# Patient Record
Sex: FEMALE | Race: WHITE | NOT HISPANIC OR LATINO | URBAN - METROPOLITAN AREA
[De-identification: names, ages, dates, MRNs, and addresses within clinical notes are randomized per-mention and may not be internally consistent; named-entity substitution may affect disease eponyms.]

---

## 2019-12-31 ENCOUNTER — EMERGENCY (EMERGENCY)
Facility: HOSPITAL | Age: 49
LOS: 0 days | Discharge: HOME | End: 2019-12-31
Attending: EMERGENCY MEDICINE | Admitting: EMERGENCY MEDICINE
Payer: COMMERCIAL

## 2019-12-31 VITALS
HEART RATE: 60 BPM | OXYGEN SATURATION: 99 % | DIASTOLIC BLOOD PRESSURE: 87 MMHG | SYSTOLIC BLOOD PRESSURE: 121 MMHG | TEMPERATURE: 97 F | RESPIRATION RATE: 18 BRPM

## 2019-12-31 DIAGNOSIS — R68.83 CHILLS (WITHOUT FEVER): ICD-10-CM

## 2019-12-31 DIAGNOSIS — K08.89 OTHER SPECIFIED DISORDERS OF TEETH AND SUPPORTING STRUCTURES: ICD-10-CM

## 2019-12-31 PROCEDURE — 99283 EMERGENCY DEPT VISIT LOW MDM: CPT | Mod: 25

## 2019-12-31 PROCEDURE — 64400 NJX AA&/STRD TRIGEMINAL NRV: CPT

## 2019-12-31 RX ORDER — BUPIVACAINE HCL/EPINEPHRINE/PF 0.5-1:200K
20 VIAL (ML) INJECTION ONCE
Refills: 0 | Status: DISCONTINUED | OUTPATIENT
Start: 2019-12-31 | End: 2019-12-31

## 2019-12-31 NOTE — ED PROVIDER NOTE - OBJECTIVE STATEMENT
48 yo female no sig hx present c/o right lower toothache/facial swelling x 1 day after right lower molar extraction 1 day ago. taking clinda and motrin without much relief. denies fever/chill/HA/ear pain/sore throat/chest pain/neck pain/sob/difficulty swallowing and breathing.

## 2019-12-31 NOTE — ED ADULT TRIAGE NOTE - CHIEF COMPLAINT QUOTE
pt c/o right tooth pain, has tooth extracted yesterday, taking motrin 800 and clindamycin, c/o pain, chills and fever.

## 2019-12-31 NOTE — ED PROVIDER NOTE - ATTENDING CONTRIBUTION TO CARE
49yF otherwise healthy p/w R lower tooth pain - had dental extraction yesterday - still on motrin/clindamycin but reports worsened pain, unrelieved by NSAIDs.  +chills.  No bleeding or purulent drainage.  No facial swelling.  Tolerating some PO.    exam w/ well appearing gums s/p dental extraction of posterior R lower molar  no concern for abscess at this time  nerve block  o/p f/u w/ dentist  continue PO abx, tylenol/motrin  return precautions

## 2019-12-31 NOTE — ED PROVIDER NOTE - PATIENT PORTAL LINK FT
You can access the FollowMyHealth Patient Portal offered by [facility] by registering at the following website: [email or web address]. By joining [employer]’s FollowMyHealth portal, you will also be able to view your health information using other applications (apps) compatible with our system.

## 2019-12-31 NOTE — ED PROCEDURE NOTE - CPROC ED POST PROC CARE GUIDE1
Avoid hot food./Verbal/written post procedure instructions were given to patient/caregiver./Instructed patient/caregiver regarding signs and symptoms of infection./Instructed patient/caregiver to follow-up with primary dentist./Avoid solid food.

## 2019-12-31 NOTE — ED PROVIDER NOTE - NSFOLLOWUPINSTRUCTIONS_ED_ALL_ED_FT
Dental Extraction, Care After  These instructions give you information about caring for yourself after your procedure. Your doctor may also give you more specific instructions. Call your doctor if you have any problems or questions after your procedure.  Follow these instructions at home:  Mouth care     Follow instructions from your doctor about how to take care of the area where your tooth was taken out. Make sure you:  Wash your hands with soap and water before you touch your mouth or your gauze pads. If you do not have soap and water, use hand .Change your gauze and take it out as told by your doctor.Leave stitches (sutures) in place. They may need to stay in place for 2 weeks or longer, or they may dissolve on their own over several weeks.If you have bleeding that does not stop, fold a clean piece of gauze and place it on the bleeding gum. Bite down on it gently but firmly. Do not chew on the gauze.Do not do any of these things until your doctor says it is okay:  Rinse your mouth.Brush or floss near the area where your tooth was taken out. You may brush your other teeth.Spit.After your doctor says that you may rinse your mouth:  Gargle with a salt-water mixture 3–4 times a day or as needed. To make a salt-water mixture, completely dissolve ½–1 tsp of salt in 1 cup of warm water.Rinse very gently. Do not rinse with a lot of force, because doing that can affect how your mouth heals.If you wear fake teeth (dental prostheses), talk with your doctor about when you may start to wear them again.Eating and drinking        Do not drink through a straw until your doctor says it is okay.Eat foods that are cool and have a soft texture, as told by your doctor. Some examples are ice cream and yogurt.Avoid hot drinks and spicy foods until your mouth has healed.Activity     Do not drive or use heavy machinery for 24 hours if you were given a medicine to help you relax (sedative) during your procedure.Do not drive or use heavy machinery while taking prescription pain medicine.Return to your normal activities as told by your doctor. Ask your doctor what activities are safe for you.Managing pain and swelling     If told, put ice on your cheek on the side of your mouth where the tooth was taken out:  Put ice in a plastic bag.Place a towel between your skin and the bag.Leave the ice on for 20 minutes, 2–3 times a day.General instructions     Take over-the-counter and prescription medicines only as told by your doctor.If you are taking prescription pain medicine, take actions to prevent or treat constipation. Your doctor may recommend that you:  Drink enough fluid to keep your pee (urine) pale yellow.Limit foods that are high in fat and processed sugars, such as fried or sweet foods.Take an over-the-counter or prescription medicine for constipation.If you were prescribed an antibiotic medicine, take it as told by your doctor. Do not stop taking the antibiotic even if you start to feel better.Do not use any products that contain nicotine or tobacco. These include cigarettes and e-cigarettes. If you need help quitting, ask your doctor.Keep all follow-up visits as told by your doctor. This is important.Contact a doctor if:  You have pain that does not get better after you take your medicine.You have any of the following:  A fever.Feeling sick to your stomach (nausea).Throwing up (vomiting).Chills.You have any of these in or near the place where your tooth used to be (socket):  A lot of redness on your face.A lot of swelling in your mouth or on your face.A small amount of clear fluid or pus.New bleeding.Your symptoms get worse.You get new symptoms.You lose feeling (numbness) in your lip or jaw and do not get it back.You have tingling in your lip or jaw that does not go away.Get help right away if:  You have very bad bleeding.You have bleeding that does not stop after you bite down on many gauze pads.You have very bad pain that does not get better with medicine.You have swelling that gets worse instead of better.You have a lot of clear fluid or pus coming from where your tooth was taken out.You have trouble swallowing.You cannot open your mouth.You have shortness of breath.You have chest pain.Summary  If you have bleeding that does not stop, fold a clean piece of gauze and place it on the bleeding gum. Bite on the gauze gently but firmly.Do not rinse your mouth or spit until your doctor says that it is okay.Avoid hot drinks and spicy foods until your mouth heals.This information is not intended to replace advice given to you by your health care provider. Make sure you discuss any questions you have with your health care provider.

## 2019-12-31 NOTE — ED PROVIDER NOTE - PHYSICAL EXAMINATION
CONSTITUTIONAL: Well-appearing; well-nourished; in no apparent distress.   ENT: + tenderness to percussion to tooth # 31. + gum swelling without fluctuant. no bleeding. + dry socket noted from tooth extraction site. no blood clot over the extraction site. + swelling noted to right mandible c/w recent tooth extraction.   NECK: Supple; non-tender; no cervical lymphadenopathy.   CARDIOVASCULAR: Normal S1, S2; no murmurs, rubs, or gallops.   RESPIRATORY: Normal chest excursion with respiration; breath sounds clear and equal bilaterally; no wheezes, rhonchi, or rales.  SKIN: No skin changes over right jaw.   NEURO/PSYCH: A & O x 4; grossly unremarkable.

## 2019-12-31 NOTE — ED PROVIDER NOTE - NS ED ROS FT
Constitutional: no fever, chills, no recent weight loss, change in appetite or malaise  Eyes: no redness/discharge/pain/vision changes  ENT: see HPI  Cardiac: No chest pain, SOB or edema.  Respiratory: No cough or respiratory distress  Skin: No skin rash.  Endocrine: No history of thyroid disease or diabetes.

## 2019-12-31 NOTE — ED PROVIDER NOTE - CLINICAL SUMMARY MEDICAL DECISION MAKING FREE TEXT BOX
49yF p/w tooth pain after tooth extraction yesterday.  exam w/ well appearing gums s/p dental extraction of posterior R lower molar. no concern for abscess at this time. nerve block. o/p f/u w/ dentist, continue PO abx, tylenol/motrin, return precautions.

## 2021-01-13 PROBLEM — Z78.9 OTHER SPECIFIED HEALTH STATUS: Chronic | Status: ACTIVE | Noted: 2020-01-01

## 2021-01-19 ENCOUNTER — OUTPATIENT (OUTPATIENT)
Dept: OUTPATIENT SERVICES | Facility: HOSPITAL | Age: 51
LOS: 1 days | Discharge: HOME | End: 2021-01-19
Payer: COMMERCIAL

## 2021-01-19 DIAGNOSIS — M25.572 PAIN IN LEFT ANKLE AND JOINTS OF LEFT FOOT: ICD-10-CM

## 2021-01-19 PROCEDURE — 76882 US LMTD JT/FCL EVL NVASC XTR: CPT | Mod: 26,LT

## 2023-02-09 ENCOUNTER — NON-APPOINTMENT (OUTPATIENT)
Age: 53
End: 2023-02-09

## 2023-09-01 ENCOUNTER — NON-APPOINTMENT (OUTPATIENT)
Age: 53
End: 2023-09-01

## 2024-01-28 NOTE — ED ADULT NURSE NOTE - MODE OF DISCHARGE
Ambulatory
I have personally evaluated and examined the patient. The Attending was available to me as a supervising provider if needed.